# Patient Record
Sex: FEMALE | Race: WHITE | Employment: STUDENT | URBAN - NONMETROPOLITAN AREA
[De-identification: names, ages, dates, MRNs, and addresses within clinical notes are randomized per-mention and may not be internally consistent; named-entity substitution may affect disease eponyms.]

---
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
## 2019-03-13 ENCOUNTER — ALLIED HEALTH/NURSE VISIT (OUTPATIENT)
Dept: FAMILY MEDICINE | Facility: OTHER | Age: 19
End: 2019-03-13
Attending: PEDIATRICS
Payer: COMMERCIAL

## 2019-03-13 ENCOUNTER — ALLIED HEALTH/NURSE VISIT (OUTPATIENT)
Dept: FAMILY MEDICINE | Facility: OTHER | Age: 19
End: 2019-03-13

## 2019-03-13 DIAGNOSIS — Z23 NEED FOR VACCINATION: Primary | ICD-10-CM

## 2019-03-13 DIAGNOSIS — Z23 NEED FOR PROPHYLACTIC VACCINATION AND INOCULATION AGAINST INFLUENZA: ICD-10-CM

## 2019-03-13 PROCEDURE — 90686 IIV4 VACC NO PRSV 0.5 ML IM: CPT

## 2019-03-13 PROCEDURE — 90472 IMMUNIZATION ADMIN EACH ADD: CPT

## 2019-03-13 PROCEDURE — 90632 HEPA VACCINE ADULT IM: CPT

## 2019-03-13 PROCEDURE — 90471 IMMUNIZATION ADMIN: CPT

## 2019-03-13 PROCEDURE — 90691 TYPHOID VACCINE IM: CPT

## 2019-03-13 NOTE — PROGRESS NOTES
Please sign order for Typhoid injection today. She is in schedule for the Hep a Flu and Typhoid. Presumed traveling out of country. Order is T up for you if ok . Thank You. Ruby Ramey RN on 3/13/2019 at 7:59 AM

## 2019-03-13 NOTE — PROGRESS NOTES
Verified patient's first and last name, and . Patient stated reason for visit today is to receive Hep A, Flu, and Typhoid shots. Patient stated she will be traveling to [place] on college trip this upcoming May. Patient stated allergy to naproxen; denied having any other allergies. Patient denied allergies to yeast gelatin neosporin eggs thimerasol or latex or past reactions to vaccinations.Hepatitis A, Influenza, and Typhoid injections prepared and administered as ordered. VIS handouts provided for all vaccinations. Updated MIIC provided to patient. Instructed to wait 15 min post injection in the lobby and to report any symptoms of a reaction to  staff who will notify nurse to check in on patient.       Injectable Influenza Immunization Documentation    1.  Is the person to be vaccinated sick today?  No    2. Does the person to be vaccinated have an allergy to a component   of the vaccine?   No  Egg Allergy Algorithm Link    3. Has the person to be vaccinated ever had a serious reaction   to influenza vaccine in the past?   No    4. Has the person to be vaccinated ever had Guillain-Barré syndrome?   No    Form completed by Aisha Sevilla RN on 3/13/2019 at 10:57 AM        Aisha Sevilla RN on 3/13/2019 at 11:14 AM

## 2019-04-24 ENCOUNTER — TRANSFERRED RECORDS (OUTPATIENT)
Dept: HEALTH INFORMATION MANAGEMENT | Facility: OTHER | Age: 19
End: 2019-04-24

## 2019-05-21 ENCOUNTER — TELEPHONE (OUTPATIENT)
Dept: FAMILY MEDICINE | Facility: OTHER | Age: 19
End: 2019-05-21

## 2019-05-21 NOTE — TELEPHONE ENCOUNTER
Left message for patients mother to call back. We need to get more information on what she is needing. Is she looking for a follow up appointment after surgery? If she has questions on what to do after surgery, she should speak to one of the nurses where the patient is having surgery.    Kelsey Hernandez LPN on 5/21/2019 at 9:50 AM

## 2019-05-24 ENCOUNTER — TRANSFERRED RECORDS (OUTPATIENT)
Dept: HEALTH INFORMATION MANAGEMENT | Facility: OTHER | Age: 19
End: 2019-05-24

## 2019-05-28 NOTE — TELEPHONE ENCOUNTER
Patients mother was contacted. Patient had surgery today and is to follow up in clinic and have AP pelvis xray in 3 weeks.   I called facility where patient had surgery and they will fax over notes on patient. Dr. Tucker Corrigan was the surgeon.    Kelsey Hernandez LPN on 5/28/2019 at 3:48 PM

## 2019-06-05 ENCOUNTER — APPOINTMENT (OUTPATIENT)
Dept: ULTRASOUND IMAGING | Facility: OTHER | Age: 19
End: 2019-06-05
Attending: FAMILY MEDICINE
Payer: COMMERCIAL

## 2019-06-05 ENCOUNTER — NURSE TRIAGE (OUTPATIENT)
Dept: PEDIATRICS | Facility: OTHER | Age: 19
End: 2019-06-05

## 2019-06-05 ENCOUNTER — HOSPITAL ENCOUNTER (EMERGENCY)
Facility: OTHER | Age: 19
Discharge: HOME | End: 2019-06-05
Attending: FAMILY MEDICINE | Admitting: FAMILY MEDICINE
Payer: COMMERCIAL

## 2019-06-05 VITALS
TEMPERATURE: 97.7 F | HEIGHT: 70 IN | OXYGEN SATURATION: 98 % | RESPIRATION RATE: 16 BRPM | BODY MASS INDEX: 23.62 KG/M2 | DIASTOLIC BLOOD PRESSURE: 72 MMHG | HEART RATE: 78 BPM | SYSTOLIC BLOOD PRESSURE: 116 MMHG | WEIGHT: 165 LBS

## 2019-06-05 DIAGNOSIS — M79.604 PAIN OF RIGHT LOWER EXTREMITY: ICD-10-CM

## 2019-06-05 PROCEDURE — 93971 EXTREMITY STUDY: CPT | Mod: RT

## 2019-06-05 PROCEDURE — 99283 EMERGENCY DEPT VISIT LOW MDM: CPT | Mod: Z6 | Performed by: FAMILY MEDICINE

## 2019-06-05 PROCEDURE — 99284 EMERGENCY DEPT VISIT MOD MDM: CPT | Mod: 25 | Performed by: FAMILY MEDICINE

## 2019-06-05 RX ORDER — FERROUS SULFATE 325(65) MG
325 TABLET ORAL
COMMUNITY
Start: 2019-05-26 | End: 2019-06-27

## 2019-06-05 RX ORDER — CELECOXIB 200 MG/1
200 CAPSULE ORAL 2 TIMES DAILY
COMMUNITY
Start: 2019-05-26 | End: 2019-06-14

## 2019-06-05 RX ORDER — OXYCODONE HYDROCHLORIDE 5 MG/1
TABLET ORAL
COMMUNITY
Start: 2019-05-27 | End: 2019-06-14

## 2019-06-05 RX ORDER — HYDROXYZINE HYDROCHLORIDE 25 MG/1
25-50 TABLET, FILM COATED ORAL 4 TIMES DAILY PRN
COMMUNITY
Start: 2019-05-24 | End: 2019-06-14

## 2019-06-05 RX ORDER — DOCUSATE SODIUM 100 MG/1
100 CAPSULE, LIQUID FILLED ORAL 2 TIMES DAILY
COMMUNITY
Start: 2019-05-24 | End: 2019-06-27

## 2019-06-05 RX ORDER — ACETAMINOPHEN 325 MG/1
650 TABLET ORAL
COMMUNITY
Start: 2019-05-26

## 2019-06-05 ASSESSMENT — ENCOUNTER SYMPTOMS
HEMATOLOGIC/LYMPHATIC NEGATIVE: 1
SHORTNESS OF BREATH: 0
APPETITE CHANGE: 0
EYES NEGATIVE: 1
CHILLS: 0
ACTIVITY CHANGE: 1
CARDIOVASCULAR NEGATIVE: 1
GASTROINTESTINAL NEGATIVE: 1
PSYCHIATRIC NEGATIVE: 1
FEVER: 0

## 2019-06-05 ASSESSMENT — MIFFLIN-ST. JEOR: SCORE: 1598.69

## 2019-06-05 NOTE — TELEPHONE ENCOUNTER
"Mom calling and states Vicki just had right hip reconstruction surgery on May 24th.  Mom states that on Monday Parveens foot is swollen and painful and having calf pain.  Mom states Vicki also has Factor V and is on blood thinners.  States Vicki has been weaker and instead of using walker has been using wheelchair.  Mom advised to bring Vicki into ER now, mom verbalized understanding and will be on there way.    Viviane Davies RN on 6/5/2019 at 8:59 AM      Additional Information    Negative: Looks like a broken bone or dislocated joint (e.g., crooked or deformed)    Negative: Sounds like a life-threatening emergency to the triager    Negative: Followed a hip injury    Negative: Followed a knee injury    Negative: Followed an ankle or foot injury    Negative: Back pain radiating (shooting) into leg(s)    Negative: Foot pain is the main symptom    Negative: Ankle pain is the main symptom    Negative: Knee pain is the main symptom    Negative: Leg swelling is the main symptom    Negative: Chest pain    Negative: Difficulty breathing    Negative: Entire foot is cool or blue in comparison to other side    Negative: Unable to walk    History of inherited increased risk of blood clots (e.g., factor 5 Leiden, antithrombin 3, protein C or protein S deficiency, prothrombin mutation)    Answer Assessment - Initial Assessment Questions  1. ONSET: \"When did the pain start?\"       Monday   2. LOCATION: \"Where is the pain located?\"       Right calf area  3. PAIN: \"How bad is the pain?\"    (Scale 1-10; or mild, moderate, severe)    -  MILD (1-3): doesn't interfere with normal activities     -  MODERATE (4-7): interferes with normal activities (e.g., work or school) or awakens from sleep, limping     -  SEVERE (8-10): excruciating pain, unable to do any normal activities, unable to walk      Hard to tell between everything s  4. WORK OR EXERCISE: \"Has there been any recent work or exercise that involved this part of the " "body?\"       Yes just had surgery bed ridden  5. CAUSE: \"What do you think is causing the leg pain?\"      Just recent surgery  6. OTHER SYMPTOMS: \"Do you have any other symptoms?\" (e.g., chest pain, back pain, breathing difficulty, swelling, rash, fever, numbness, weakness)      Swelling, weakness,  7. PREGNANCY: \"Is there any chance you are pregnant?\" \"When was your last menstrual period?\"      n/a    Protocols used: LEG PAIN-A-OH      "

## 2019-06-05 NOTE — DISCHARGE INSTRUCTIONS
Use tylenol regularly for the next two days . Use even if you are not using the muscle relaxants.Followup with Ortho as needed.

## 2019-06-05 NOTE — ED PROVIDER NOTES
"  History     Chief Complaint   Patient presents with     Leg Pain     DVT r/o, hip sx 5/24, factor V lieden      HPI  Vicki Chavez is a 19 year old female with a history of hip dysplasia and factor V Leiden mutation who into the emergency room with right leg pain.  She underwent hip arthroscopy with repair osteotomy with a \"Lyric\" procedure on 5/24/2019 and was discharged on 5/29/2019 she did have some issues with acute blood loss anemia with a hemoglobin down to 7.8 with recovery back to 8.3.  She was sent home on Eliquis secondary to factor V Leiden mutation for prevention of VTE.  She presented to the emergency room with increasing right leg pain.  She had spoken with her surgeon who wanted her to be evaluated for a venous clot.  She has no other symptoms or complaints no shortness of breath or chest pain.  Has some burning in her foot but by review of her chart that would be considered normal in the postop area per the surgeon.    Allergies:  Allergies   Allergen Reactions     Naproxen Hives     Urticaria,joint and lip angioedema, no respiratory compromise.        Problem List:    Patient Active Problem List    Diagnosis Date Noted     Headache 02/14/2018     Priority: Medium     Overview:   Vicki gets both migraine and daily tension headaches.  Tends to lose vision with migraines.  Seeing chiropractor and feels that is helpful.  Signed by Corina Palacio MD .....7/18/2014 2:42 PM       Family history of factor V Leiden mutation 07/15/2016     Priority: Medium     Compound nevus 07/20/2015     Priority: Medium        Past Medical History:    Past Medical History:   Diagnosis Date     Constipation      Headache      Other allergy status, other than to drugs and biological substances      Other fracture of right lower leg, initial encounter for closed fracture      Personal history of diseases of skin or subcutaneous tissue        Past Surgical History:    Past Surgical History:   Procedure Laterality " "Date     OTHER SURGICAL HISTORY      7-2016,[id number],OTHER,Right,Lucretia Melvin       Family History:    Family History   Problem Relation Age of Onset     Other - See Comments Mother         Psychiatric illness,describes herself as an anxious person     Other - See Comments Father         Factor V Leiden     Heart Disease Father         Heart Disease, PEs x 4     Asthma Father         Asthma     Heart Disease Maternal Grandfather         Heart Disease, MI, pacemaker       Social History:  Marital Status:  Single [1]  Social History     Tobacco Use     Smoking status: Never Smoker     Smokeless tobacco: Never Used   Substance Use Topics     Alcohol use: No     Drug use: Unknown     Types: Other     Comment: Drug use: No        Medications:      acetaminophen (TYLENOL) 325 MG tablet   apixaban ANTICOAGULANT (ELIQUIS) 2.5 MG tablet   celecoxib (CELEBREX) 200 MG capsule   docusate sodium (COLACE) 100 MG capsule   ferrous sulfate (FEROSUL) 325 (65 Fe) MG tablet   hydrOXYzine (ATARAX) 25 MG tablet   oxyCODONE (ROXICODONE) 5 MG tablet         Review of Systems   Constitutional: Positive for activity change. Negative for appetite change, chills and fever.   HENT: Negative.    Eyes: Negative.    Respiratory: Negative for shortness of breath.    Cardiovascular: Negative.  Negative for chest pain.   Gastrointestinal: Negative.    Genitourinary: Negative.    Musculoskeletal: Positive for gait problem.        Right leg pain.   Skin: Negative.    Hematological: Negative.    Psychiatric/Behavioral: Negative.        Physical Exam   BP: 117/63  Pulse: 77  Heart Rate: 90  Temp: 97.7  F (36.5  C)  Resp: 16  Height: 177.8 cm (5' 10\")  Weight: 74.8 kg (165 lb)  SpO2: 99 %      Physical Exam   Constitutional: She is oriented to person, place, and time. She appears well-developed and well-nourished. No distress.   HENT:   Head: Normocephalic and atraumatic.   Right Ear: External ear normal.   Left Ear: External ear normal.   Nose: Nose " normal.   Mouth/Throat: Oropharynx is clear and moist.   Eyes: Pupils are equal, round, and reactive to light. Conjunctivae and EOM are normal.   Neck: Normal range of motion. Neck supple. No thyromegaly present.   Cardiovascular: Normal rate, regular rhythm and normal heart sounds.   Pulmonary/Chest: Effort normal and breath sounds normal. No stridor. No respiratory distress. She has no wheezes.   Musculoskeletal: Normal range of motion. She exhibits tenderness. She exhibits no edema or deformity.   Slight tenderness with palpation of the calf.  Calf does not appear swollen.   Neurological: She is alert and oriented to person, place, and time.   Skin: Skin is warm and dry. Capillary refill takes less than 2 seconds. She is not diaphoretic.   Nursing note and vitals reviewed.      ED Course   Patient seen and examined.  Sent for venous Doppler ultrasound of the right lower extremity.  Ultrasound was read as negative.  Informed patient and her mother and they are comfortable with the plan to just follow-up with her orthopedic surgeon.  No additional blood work or work-up is necessary at this time.  If they feel there is an increase of pain they should let to their surgeon know and/or return to the emergency room.     Procedures          Results for orders placed or performed during the hospital encounter of 06/05/19 (from the past 24 hour(s))   US Lower Extremity Venous Duplex Right    Narrative    EXAM:US LOWER EXTREMITY VENOUS DUPLEX RIGHT    HISTORY: right calf pain s/p hip surgery- has Factor V Leiden  mutation.       COMPARISONS: None    TECHNIQUE: Venous duplex ultrasonography of the right lower extremity  was performed.     FINDINGS: The common femoral vein, femoral vein and popliteal vein are  fully compressible with spontaneous and augmentable venous flow.  Visualized calf veins are also patent.         Impression    IMPRESSION: Negative for DVT.    SHAN MCKEON MD       Medications - No data to  display    Assessments & Plan (with Medical Decision Making)     I have reviewed the nursing notes.    I have reviewed the findings, diagnosis, plan and need for follow up with the patient.  See note above.     Medication List      There are no discharge medications for this visit.         Final diagnoses:   Pain of right lower extremity       6/5/2019   [facility]  06/05/19 [id number]

## 2019-06-05 NOTE — ED TRIAGE NOTES
Patient to ER reporting R calf pain x 2 days, she also admits to R hip and foot pain since her hip dysplasia sx on 5/24. Patient reports hx of factor V Leiden.

## 2019-06-05 NOTE — ED AVS SNAPSHOT
[facility]  [address]  [place] [address]  Phone:  [phone]  Fax:  [phone]                                    Vicki Chavez   MRN: [id number]    Department:  [facility]   Date of Visit:  6/5/2019           After Visit Summary Signature Page    I have received my discharge instructions, and my questions have been answered. I have discussed any challenges I see with this plan with the nurse or doctor.    ..........................................................................................................................................  Patient/Patient Representative Signature      ..........................................................................................................................................  Patient Representative Print Name and Relationship to Patient    ..................................................               ................................................  Date                                   Time    ..........................................................................................................................................  Reviewed by Signature/Title    ...................................................              ..............................................  Date                                               Time          22EPIC Rev 08/18

## 2019-06-14 ENCOUNTER — OFFICE VISIT (OUTPATIENT)
Dept: FAMILY MEDICINE | Facility: OTHER | Age: 19
End: 2019-06-14
Attending: FAMILY MEDICINE
Payer: COMMERCIAL

## 2019-06-14 ENCOUNTER — HOSPITAL ENCOUNTER (OUTPATIENT)
Dept: GENERAL RADIOLOGY | Facility: OTHER | Age: 19
Discharge: HOME | End: 2019-06-14
Attending: FAMILY MEDICINE | Admitting: FAMILY MEDICINE
Payer: COMMERCIAL

## 2019-06-14 VITALS
HEART RATE: 92 BPM | DIASTOLIC BLOOD PRESSURE: 78 MMHG | SYSTOLIC BLOOD PRESSURE: 118 MMHG | BODY MASS INDEX: 23.65 KG/M2 | RESPIRATION RATE: 16 BRPM | TEMPERATURE: 97.5 F | HEIGHT: 70 IN | WEIGHT: 165.2 LBS

## 2019-06-14 DIAGNOSIS — Z98.890 STATUS POST HIP SURGERY: Primary | ICD-10-CM

## 2019-06-14 DIAGNOSIS — Z98.890 STATUS POST HIP SURGERY: ICD-10-CM

## 2019-06-14 DIAGNOSIS — D62 ANEMIA DUE TO BLOOD LOSS, ACUTE: ICD-10-CM

## 2019-06-14 LAB — HGB BLD-MCNC: 12 G/DL (ref 11.7–15.7)

## 2019-06-14 PROCEDURE — 99213 OFFICE O/P EST LOW 20 MIN: CPT | Performed by: FAMILY MEDICINE

## 2019-06-14 PROCEDURE — 72170 X-RAY EXAM OF PELVIS: CPT

## 2019-06-14 PROCEDURE — 36415 COLL VENOUS BLD VENIPUNCTURE: CPT | Mod: ZL | Performed by: FAMILY MEDICINE

## 2019-06-14 PROCEDURE — 85018 HEMOGLOBIN: CPT | Mod: ZL | Performed by: FAMILY MEDICINE

## 2019-06-14 ASSESSMENT — MIFFLIN-ST. JEOR: SCORE: 1599.59

## 2019-06-14 ASSESSMENT — PAIN SCALES - GENERAL: PAINLEVEL: EXTREME PAIN (8)

## 2019-06-14 NOTE — NURSING NOTE
Patient presents today for follow up on right hip surgery.  Medication Reconciliation Complete    Kelsey Hernandez LPN  6/14/2019 12:41 PM

## 2019-06-14 NOTE — PROGRESS NOTES
"SUBJECTIVE:  Vicki Chavez is a 19 year old female here for follow-up.  She had right hip arthroscopy for labral tear and impingement on May 24 in [place].  She comes in today for suture removal.  She is having increasing pain going on her right leg is consistent with neuropathy.  She contacted her surgeon who states that this is expected after her procedure.  She is also seen in emergency room where an ultrasound performed to rule out DVT and came back negative.  She reports that her pain has been controlled with Tylenol.    Procedure was comp located by acute blood loss anemia.  She is currently using iron is tolerating this well.    ROS:    As above otherwise ROS is unremarkable.    OBJECTIVE:  /78   Pulse 92   Temp 97.5  F (36.4  C) (Tympanic)   Resp 16   Ht 1.778 m (5' 10\")   Wt 74.9 kg (165 lb 3.2 oz)   LMP 05/15/2019   BMI 23.70 kg/m      EXAM:  General Appearance: Pleasant, alert, appropriate appearance for age. No acute distress  Skin: Large incision is healing well.  She has 2 smaller arthroscopic incisions with sutures that were removed without any difficulty.  Incisions are clean, dry and intact.    Results for orders placed or performed in visit on 06/14/19   Hemoglobin   Result Value Ref Range    Hemoglobin 12.0 11.7 - 15.7 g/dL         ASSESSEMENT AND PLAN:    1. Status post hip surgery    2. Anemia due to blood loss, acute      Sutures removed without difficulty.  She will follow-up with orthopedics as scheduled.  X-ray was repeated as requested by her surgeon.  I will attempt to send his pictures through PACS.    Her hemoglobin is seen above.  Given that she is essentially back to her preoperative level she can stop her iron.  Would recommend continuing stool softeners given her amount of constipation on her x-ray.    Erickson Magana MD    This document was prepared using voice generated software.  While every attempt was made for accuracy, grammatical errors may exist.  "

## 2019-06-14 NOTE — Clinical Note
Send copy of today's progress note to Dr. Christopher Corrigan, [facility] Ortho and sports medicine department.

## 2019-06-20 ENCOUNTER — HOSPITAL ENCOUNTER (OUTPATIENT)
Dept: PHYSICAL THERAPY | Facility: OTHER | Age: 19
Setting detail: THERAPIES SERIES
End: 2019-06-20
Payer: COMMERCIAL

## 2019-06-20 DIAGNOSIS — Q65.89 HIP DYSPLASIA: ICD-10-CM

## 2019-06-20 PROCEDURE — 97110 THERAPEUTIC EXERCISES: CPT | Mod: GP

## 2019-06-20 PROCEDURE — 97161 PT EVAL LOW COMPLEX 20 MIN: CPT | Mod: GP

## 2019-06-20 NOTE — PROGRESS NOTES
06/20/19 0700   General Information   Type of Visit Initial OP Ortho PT Evaluation   Start of Care Date 06/20/19   Referring Physician Viktor Petersen MD   Patient/Family Goals Statement return to normal activities   Orders Evaluate and Treat   Orders Comment see rehab protocol   Date of Order 05/28/19   Certification Required? No   Medical Diagnosis s/p right hip arthroscopy and right periacetabular osteotomy   Surgical/Medical history reviewed Yes   General Information Comments flat foot touch weight bearing 6 weeks, no more than 90 degrees hip flexion, no resisted hip flexion   Body Part(s)   Body Part(s) Hip   Presentation and Etiology   Pertinent history of current problem (include personal factors and/or comorbidities that impact the POC) History of bilateral hip displasia, prior scope of right hip by Dr. Carpenter. Recent underwent a right hip scope and right periacetabular osteotomy on 5/24/19 byt Dr. Corrigan and Dr. Petersen at [facility]. TTWB for 6 weeks with crutches or walker. 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 Suture removal 6/14 at [facility]. Currently needs assist with most actvities during the day. No longer using ice. Taking tylenol prn. Working on quad sets, glute sets, ankle pumps. Follwing restrictions. Mother present during session. Having nerve pain in right lower leg and foot. Constipation has improved now that she is off pain meds. Sleeping in [place]. STarting to use bed more but can't last all night. Follow up in [place] on 7/2/19. Patient will return to [place] in august for school and will continue PT there.    Impairments A. Pain;B. Decreased WB tolerance;C. Swelling;D. Decreased ROM;E. Decreased flexibility;F. Decreased strength and endurance;G. Impaired balance;H. Impaired gait;J. Burning;K. Numbness;L. Tingling   Functional Limitations perform activities of daily living;perform desired leisure / sports activities   Onset date of current  episode/exacerbation 05/24/19   Chronicity New   Pain rating (0-10 point scale) Worst (/10)   Worst (/10) 5   Pain quality A. Sharp;B. Dull;C. Aching;E. Shooting   Frequency of pain/symptoms A. Constant   Pain/symptoms exacerbated by A. Sitting;B. Walking;C. Lifting;D. Carrying;G. Certain positions   Pain/symptoms eased by E. Changing positions;I. OTC medication(s)   Progression of symptoms since onset: Improved   Prior Level of Function   Prior Level of Function-Mobility indepedent   Prior Level of Function-ADLs indepedent   Current Level of Function   Patient role/employment history B. Student   Hip Objective Findings   Side (if bilateral, select both right and left) Right   Observation closed incision, dry skin, needing lotion   Integumentary  limited scar mobility right anterior pelvis   Gait/Locomotion requires axillary crutches, TTWB   Hip ROM Comments neutral hip extension, full right knee motion   Hip/Knee Strength Comments deferred due to recent surgery and precautions   Hip Flexibility Comments hamstring tightness bilateral   Palpation noted inferior right pubic shear, right leg length long in supine, right ASIS superior in supine.    Right Hip Abduction PROM to 90, pain at end   Planned Therapy Interventions   Planned Therapy Interventions balance training;gait training;manual therapy;neuromuscular re-education;strengthening;stretching;transfer training   Planned Modality Interventions   Planned Modality Interventions Cryotherapy;Electrical stimulation   Clinical Impression   Criteria for Skilled Therapeutic Interventions Met yes, treatment indicated   PT Diagnosis right  hip pain, muscle weakness, limited ambuation and balance   Influenced by the following impairments pain, weakness, loss of motion, numbness, burning   Functional limitations due to impairments sit to stand, supine to sit, sit to supine, gait, balance, lower body dressing, stair use, showers, driving   Clinical Presentation  Stable/Uncomplicated   Clinical Decision Making (Complexity) Low complexity   Therapy Frequency 2 times/Week   Predicted Duration of Therapy Intervention (days/wks) 6 months  (as needed)   Risk & Benefits of therapy have been explained Yes   Patient, Family & other staff in agreement with plan of care Yes   Clinical Impression Comments Patient s/p right hip scope and MISAEL.    ORTHO GOALS   PT Ortho Eval Goals 1;2;3;4;5;6   Ortho Goal 1   Goal Identifier ADL's   Goal Description Patient to return to lower body dressing without assistance.    Target Date 12/20/19   Ortho Goal 2   Goal Identifier gait   Goal Description Patient to return to full weight bearing on right leg and ambulation without AD on level surfaces.   Target Date 12/20/19   Ortho Goal 3   Goal Identifier stair use   Goal Description Patient to negotiate a full fight of steps without hand rail, returnign to prior functional status.   Target Date 12/20/19   Ortho Goal 4   Goal Identifier driving   Goal Description Patient to return to driving.   Target Date 12/20/19   Ortho Goal 5   Goal Identifier MMT   Goal Description Patient to progress to 5/5 MMT of right and left hip muscles for stabilization during daily tasks and mobility.   Target Date 12/20/19   Ortho Goal 6   Goal Identifier HEP   Goal Description Patient to be complaint with HEP to maximize recovery and functional strength.    Target Date 12/20/19   Total Evaluation Time   PT Eval, Low Complexity Minutes (86250) 15

## 2019-06-27 ENCOUNTER — OFFICE VISIT (OUTPATIENT)
Dept: FAMILY MEDICINE | Facility: OTHER | Age: 19
End: 2019-06-27
Attending: PHYSICIAN ASSISTANT
Payer: COMMERCIAL

## 2019-06-27 VITALS
OXYGEN SATURATION: 98 % | DIASTOLIC BLOOD PRESSURE: 70 MMHG | HEART RATE: 105 BPM | HEIGHT: 70 IN | TEMPERATURE: 97.2 F | RESPIRATION RATE: 16 BRPM | BODY MASS INDEX: 23.58 KG/M2 | SYSTOLIC BLOOD PRESSURE: 120 MMHG | WEIGHT: 164.7 LBS

## 2019-06-27 DIAGNOSIS — M79.2 NERVE PAIN: Primary | ICD-10-CM

## 2019-06-27 PROCEDURE — 99214 OFFICE O/P EST MOD 30 MIN: CPT | Performed by: PHYSICIAN ASSISTANT

## 2019-06-27 RX ORDER — GABAPENTIN 300 MG/1
CAPSULE ORAL
Qty: 18 CAPSULE | Refills: 0 | Status: SHIPPED | OUTPATIENT
Start: 2019-06-27 | End: 2020-03-18

## 2019-06-27 ASSESSMENT — PAIN SCALES - GENERAL: PAINLEVEL: EXTREME PAIN (8)

## 2019-06-27 ASSESSMENT — MIFFLIN-ST. JEOR: SCORE: 1597.32

## 2019-06-27 NOTE — PROGRESS NOTES
"SUBJECTIVE:  Vicki Chavez is a 19 year old female presents to [facility] for help with nerve pain  Patient had hip surgery 5/24/19 for Hip displasia. She has been in contact with her surgeon regarding the pain and has a follow up next week in [place]  Associated symptoms - pain in toes and medial arch of right foot  Pain was very severe last night, she couldn't sleep    Location - dorsal and plantar forefoot and toes  Quality - intermittent sharp shooting pain duration moments to seconds 10/10 pain  Constant dull ache - 8/10  Aggravated by - bending knee and ankle, brushing toes and medial foot on bed sheet  Alleviated by - nothing  Associated symptoms - mild swelling, dull ache and sharp pains   Treatments - tylenol, ice, essential oils, heating pad, stretching   Prior fracture or injury - Hip surgery 5/24/19      Past Medical History:   Diagnosis Date     Constipation     04/20/07     Headache     04/10/09     Other allergy status, other than to drugs and biological substances     4/14/06     Other fracture of right lower leg, initial encounter for closed fracture     4/2014     Personal history of diseases of skin or subcutaneous tissue     4/14/06     Current Outpatient Medications   Medication     acetaminophen (TYLENOL) 325 MG tablet     apixaban ANTICOAGULANT (ELIQUIS) 2.5 MG tablet     No current facility-administered medications for this visit.         Allergies   Allergen Reactions     Naproxen Hives     Urticaria,joint and lip angioedema, no respiratory compromise.          ROS  General: feels well, no fever  Musculoskeletal: injury per HPI      OBJECTIVE:  Vitals:    06/27/19 1051   BP: 120/70   BP Location: Right arm   Patient Position: Sitting   Cuff Size: Adult Regular   Pulse: 105   Resp: 16   Temp: 97.2  F (36.2  C)   TempSrc: Tympanic   SpO2: 98%   Weight: 74.7 kg (164 lb 11.2 oz)   Height: 1.778 m (5' 10\")     Vital signs as noted above.  Appearance: in no apparent " distress.  Musculoskeletal: hip surgery incision - 10 cm vertical incision and 3 cm branch. No swelling, erythema, warmth  Inspection: no infection to incision. Toes and foot no abnormality noted  Palpation: Toes forefoot and medial arch are very painful to touch  Neurovascular: normal pulses, cap refill,  temperature  ROM: normal      ASSESSMENT:  (M79.2) Nerve pain  (primary encounter diagnosis)    Plan: gabapentin (NEURONTIN) 300 MG capsule    Will try gabapentin taper  Discussed dosing  Patient will have surgeon continue or provide a taper to stop medication. Discussed potential side effects  Follow up with Surgeon next week  Return to clinic if symptoms persist or worsen  Patient received verbal and written instruction including review of warning signs    Toshia Urbina PA-C on 6/27/2019 at 7:56 PM

## 2019-06-27 NOTE — PATIENT INSTRUCTIONS
Start gabapentin 300 mg at bedtime day 1, day 2 - 300 mg bid, day 3 - 7 300 mg tid  Follow up with surgeon next week  Continue with tylenol 1000 mg every 4-6 hours as needed

## 2019-06-27 NOTE — NURSING NOTE
Chief Complaint   Patient presents with     Leg Pain       Medication Reconciliation: complete   Patient presents with nerve pain. Patient had reconstructive hip surgery 5 weeks ago. Patient was told that it's normal but pain is worsened. Patient has no pain meds currently. Patient is on blood thinners.  Patient has a f/u next week with surgeon in [place].   Amy Villela LPN  ..................6/27/2019   10:50 AM

## 2019-07-01 ENCOUNTER — HOSPITAL ENCOUNTER (OUTPATIENT)
Dept: PHYSICAL THERAPY | Facility: OTHER | Age: 19
Setting detail: THERAPIES SERIES
End: 2019-07-01
Payer: COMMERCIAL

## 2019-07-01 PROCEDURE — 97110 THERAPEUTIC EXERCISES: CPT | Mod: GP

## 2019-07-08 ENCOUNTER — HOSPITAL ENCOUNTER (OUTPATIENT)
Dept: PHYSICAL THERAPY | Facility: OTHER | Age: 19
Setting detail: THERAPIES SERIES
End: 2019-07-08
Payer: COMMERCIAL

## 2019-07-08 PROCEDURE — 97112 NEUROMUSCULAR REEDUCATION: CPT | Mod: GP

## 2019-07-08 PROCEDURE — 97110 THERAPEUTIC EXERCISES: CPT | Mod: GP

## 2019-07-10 ENCOUNTER — HOSPITAL ENCOUNTER (OUTPATIENT)
Dept: PHYSICAL THERAPY | Facility: OTHER | Age: 19
Setting detail: THERAPIES SERIES
End: 2019-07-10
Payer: COMMERCIAL

## 2019-07-10 PROCEDURE — 97110 THERAPEUTIC EXERCISES: CPT | Mod: GP

## 2019-07-17 ENCOUNTER — HOSPITAL ENCOUNTER (OUTPATIENT)
Dept: PHYSICAL THERAPY | Facility: OTHER | Age: 19
Setting detail: THERAPIES SERIES
End: 2019-07-17
Payer: COMMERCIAL

## 2019-07-17 PROCEDURE — 97112 NEUROMUSCULAR REEDUCATION: CPT | Mod: GP

## 2019-07-17 PROCEDURE — 97110 THERAPEUTIC EXERCISES: CPT | Mod: GP

## 2019-07-24 ENCOUNTER — HOSPITAL ENCOUNTER (OUTPATIENT)
Dept: PHYSICAL THERAPY | Facility: OTHER | Age: 19
Setting detail: THERAPIES SERIES
End: 2019-07-24
Payer: COMMERCIAL

## 2019-07-24 PROCEDURE — 97110 THERAPEUTIC EXERCISES: CPT | Mod: GP

## 2019-08-02 ENCOUNTER — HOSPITAL ENCOUNTER (OUTPATIENT)
Dept: PHYSICAL THERAPY | Facility: OTHER | Age: 19
Setting detail: THERAPIES SERIES
End: 2019-08-02
Payer: COMMERCIAL

## 2019-08-02 PROCEDURE — 97110 THERAPEUTIC EXERCISES: CPT | Mod: GP

## 2019-08-09 ENCOUNTER — HOSPITAL ENCOUNTER (OUTPATIENT)
Dept: PHYSICAL THERAPY | Facility: OTHER | Age: 19
Setting detail: THERAPIES SERIES
End: 2019-08-09
Payer: COMMERCIAL

## 2019-08-09 PROCEDURE — 97112 NEUROMUSCULAR REEDUCATION: CPT | Mod: GP

## 2019-08-09 PROCEDURE — 97110 THERAPEUTIC EXERCISES: CPT | Mod: GP

## 2019-08-16 ENCOUNTER — HOSPITAL ENCOUNTER (OUTPATIENT)
Dept: PHYSICAL THERAPY | Facility: OTHER | Age: 19
Setting detail: THERAPIES SERIES
End: 2019-08-16
Payer: COMMERCIAL

## 2019-08-16 PROCEDURE — 97112 NEUROMUSCULAR REEDUCATION: CPT | Mod: GP

## 2019-08-16 PROCEDURE — 97110 THERAPEUTIC EXERCISES: CPT | Mod: GP

## 2019-08-16 NOTE — PROGRESS NOTES
Outpatient Physical Therapy Discharge Note     Patient: Vicki Chavez  : 2000    Beginning/End Dates of Reporting Period:  19 to 2019    Referring Provider: Viktor Petersen MD    Therapy Diagnosis: right  hip pain, muscle weakness, limited ambuation and balance     Client Self Report: Less pain over the last week. Has contact for PT in [place] to fax notes, etc. No pool this week, [facility] [place] [facility] Working on walking with at least 50% weight bearing through right leg. Can complete floor transfers but it is not easy. Will be practicing driving over the next week. Has a handicap accessible apartment on campus. Follow up with surgical team 19.     Objective Measurements:  Objective Measure: joint mobility  Details: ERS right L5     Hip ROM 0-110, limited by pain at end range flexion    Goals:  Goal Identifier ADL's   Goal Description Patient to return to lower body dressing without assistance.    Target Date 19   Date Met   19   Progress:     Goal Identifier gait   Goal Description Patient to return to full weight bearing on right leg and ambulation without AD on level surfaces.   Target Date 19   Date Met      Progress: still working towards this goal, at 50 %      Goal Identifier stair use   Goal Description Patient to negotiate a full fight of steps without hand rail, returnign to prior functional status.   Target Date 19   Date Met      Progress: still working towards this goal     Goal Identifier driving   Goal Description Patient to return to driving.   Target Date 19   Date Met      Progress: still working towards this gaol     Goal Identifier MMT   Goal Description Patient to progress to 5/5 MMT of right and left hip muscles for stabilization during daily tasks and mobility.   Target Date 19   Date Met      Progress: will continue to progress with this goal during course of rehab     Goal Identifier HEP   Goal Description Patient to be  "complaint with HEP to maximize recovery and functional strength.    Target Date 12/20/19   Date Met   8/16/19   Progress:       Progress Toward Goals:   Progress this reporting period: Patient is now 12 weeks post op MISAEL surgery of the right hip. Date of surgery was 5/24/19. She is still working on increasing her weight bearing tolerance; pain and weakness are limiting factors. Commonly, patient presents with dysfunctions at L5, pubic symphysis and sacrum that add to her pain, limited gait and \"popping\" of the right hip.          Plan:  Discharge from therapy.    Discharge:    Reason for Discharge: Patient returning to college in [place] and will be continuing physical therapy there. [facility], [place], [place]    Equipment Issued: NA    Discharge Plan: Patient to continue home program.  "

## 2020-03-18 ENCOUNTER — OFFICE VISIT (OUTPATIENT)
Dept: FAMILY MEDICINE | Facility: OTHER | Age: 20
End: 2020-03-18
Attending: FAMILY MEDICINE
Payer: COMMERCIAL

## 2020-03-18 VITALS
DIASTOLIC BLOOD PRESSURE: 70 MMHG | WEIGHT: 175.13 LBS | SYSTOLIC BLOOD PRESSURE: 108 MMHG | HEART RATE: 72 BPM | BODY MASS INDEX: 25.13 KG/M2 | TEMPERATURE: 96.4 F | RESPIRATION RATE: 16 BRPM

## 2020-03-18 DIAGNOSIS — Z48.89 ENCOUNTER FOR POST SURGICAL WOUND CHECK: Primary | ICD-10-CM

## 2020-03-18 DIAGNOSIS — L03.311 CELLULITIS OF ABDOMINAL WALL: ICD-10-CM

## 2020-03-18 PROCEDURE — 99213 OFFICE O/P EST LOW 20 MIN: CPT | Performed by: FAMILY MEDICINE

## 2020-03-18 RX ORDER — HYDROXYZINE PAMOATE 25 MG/1
25-50 CAPSULE ORAL 4 TIMES DAILY
COMMUNITY
Start: 2020-03-06 | End: 2020-08-04

## 2020-03-18 RX ORDER — CEPHALEXIN 500 MG/1
500 CAPSULE ORAL 4 TIMES DAILY
Qty: 28 CAPSULE | Refills: 0 | Status: SHIPPED | OUTPATIENT
Start: 2020-03-18 | End: 2020-08-04

## 2020-03-18 RX ORDER — OXYCODONE AND ACETAMINOPHEN 5; 325 MG/1; MG/1
1-2 TABLET ORAL EVERY 6 HOURS PRN
COMMUNITY
Start: 2020-03-06 | End: 2020-08-04

## 2020-03-18 ASSESSMENT — ENCOUNTER SYMPTOMS
SORE THROAT: 0
FATIGUE: 0
CHILLS: 0
SHORTNESS OF BREATH: 0
PALPITATIONS: 0
COUGH: 0
SINUS PAIN: 0
APPETITE CHANGE: 0

## 2020-03-18 ASSESSMENT — PAIN SCALES - GENERAL: PAINLEVEL: MODERATE PAIN (5)

## 2020-03-18 NOTE — NURSING NOTE
"Chief Complaint   Patient presents with     Wound Check       Initial /70   Pulse 72   Temp 96.4  F (35.8  C) (Tympanic)   Resp 16   Wt 79.4 kg (175 lb 2 oz)   LMP 03/05/2020 (Exact Date)   BMI 25.13 kg/m   Estimated body mass index is 25.13 kg/m  as calculated from the following:    Height as of 6/27/19: 1.778 m (5' 10\").    Weight as of this encounter: 79.4 kg (175 lb 2 oz).  Medication Reconciliation: complete    Corie Miles LPN  "

## 2020-03-18 NOTE — PROGRESS NOTES
"Nursing Notes:   Corie Miles LPN  3/18/2020  8:52 AM  Signed  Chief Complaint   Patient presents with     Wound Check     Initial /70   Pulse 72   Temp 96.4  F (35.8  C) (Tympanic)   Resp 16   Wt 79.4 kg (175 lb 2 oz)   LMP 03/05/2020 (Exact Date)   BMI 25.13 kg/m   Estimated body mass index is 25.13 kg/m  as calculated from the following:    Height as of 6/27/19: 1.778 m (5' 10\").    Weight as of this encounter: 79.4 kg (175 lb 2 oz).  Medication Reconciliation: complete  Corie Miles LPN    SUBJECTIVE:   Vicki Chavez is a 19 year old female who presents to clinic today for the following health issues:    HPI  Vicki is here for an incision check s/p hardware removal of R hip.  She had this completed on 3/6/2020 at [facility].  She initially underwent a R hip MISAEL with arthroscopy and labral repair, femoral head/neck offset correction on 5/24/2019  Since being home, she has healed relatively well.  She is having some sharp pains and some itching at incision site.  Noticed in the last few days some spreading redness.  No drainage from site.  Does have a mesh covering/dressing in place that is mostly intact.  No foul smell.   Also has an area of suture coming out of skin that is somewhat bothersome for her.    Patient Active Problem List    Diagnosis Date Noted     Headache 02/14/2018     Priority: Medium     Overview:   Vicki gets both migraine and daily tension headaches.  Tends to lose vision with migraines.  Seeing chiropractor and feels that is helpful.  Signed by Corina Palaico MD .....7/18/2014 2:42 PM       Family history of factor V Leiden mutation 07/15/2016     Priority: Medium     Compound nevus 07/20/2015     Priority: Medium     Past Medical History:   Diagnosis Date     Constipation     04/20/07     Headache     04/10/09     Other allergy status, other than to drugs and biological substances     4/14/06     Other fracture of right lower leg, initial encounter for closed " fracture     4/2014     Personal history of diseases of skin or subcutaneous tissue     4/14/06      Past Surgical History:   Procedure Laterality Date     ARTHROSCOPY HIP Right 07/2016    Lucretia Melvin     ARTHROSCOPY HIP Right 05/24/2019    MISAEL with arthroscopy and labral repair, femoral head/neck offset correction; [employer] [place]     REMOVE HARDWARE LOWER EXTREMITY Right 2020    hip     Family History   Problem Relation Age of Onset     Other - See Comments Mother         Psychiatric illness,describes herself as an anxious person     Other - See Comments Father         Factor V Leiden     Heart Disease Father         Heart Disease, PEs x 4     Asthma Father         Asthma     Diabetes Father      Hyperlipidemia Father      Heart Disease Maternal Grandfather         Heart Disease, MI, pacemaker     Diabetes Paternal Grandmother      Hyperlipidemia Paternal Grandmother      Social History     Tobacco Use     Smoking status: Never Smoker     Smokeless tobacco: Never Used   Substance Use Topics     Alcohol use: Yes     Comment: socially      Social History     Social History Narrative    Matt Father - works at [employer]      Davida Mother -teaches in [employer], 9/01 - tubes     Current Outpatient Medications   Medication Sig Dispense Refill     cephALEXin (KEFLEX) 500 MG capsule Take 1 capsule (500 mg) by mouth 4 times daily for 7 days 28 capsule 0     hydrOXYzine (VISTARIL) 25 MG capsule Take 25-50 mg by mouth 4 times daily As needed       oxyCODONE-acetaminophen (PERCOCET) 5-325 MG tablet Take 1-2 tablets by mouth every 6 hours as needed       acetaminophen (TYLENOL) 325 MG tablet Take 650 mg by mouth       Allergies   Allergen Reactions     Naproxen Hives     Urticaria,joint and lip angioedema, no respiratory compromise.      Review of Systems   Constitutional: Negative for appetite change, chills and fatigue.   HENT: Negative for congestion, sinus pain and sore throat.     Respiratory: Negative for cough and shortness of breath.    Cardiovascular: Negative for chest pain and palpitations.   Skin: Negative for rash.      OBJECTIVE:     /70   Pulse 72   Temp 96.4  F (35.8  C) (Tympanic)   Resp 16   Wt 79.4 kg (175 lb 2 oz)   LMP 03/05/2020 (Exact Date)   BMI 25.13 kg/m    Body mass index is 25.13 kg/m .  Physical Exam  Vitals signs and nursing note reviewed.   Constitutional:       Appearance: Normal appearance.   HENT:      Head: Normocephalic.   Musculoskeletal:      Right hip: She exhibits normal range of motion and no tenderness.   Skin:            Comments: Extending at most 5cm away from incision in irregular distribution - mostly to R flank and toward suprapubic region.  Minimal erythematous spread inferiorly to groin.  No drainage, fluctuation, induration, odor appreciated on exam. No warmth.  3cm of light colored suture extruded from superior margin of incision.  Mesh covering lower half of incision without other obvious suture/dehiscence/etc.   Neurological:      Mental Status: She is alert.     Diagnostic Test Results:  None    ASSESSMENT/PLAN:     1. Encounter for post surgical wound check  Suture trimmed; and recommended to trim mesh dressing that has peeled away.  Can leave remainder in place for full two weeks as it is well adhered for now.    2. Cellulitis of abdominal wall  Erythematous spread; will start Cephalexin 500mg QID x 7 days.  Do not have concern for deeper infection as she is otherwise moving well; afebrile and no systemic complaints.  Discussed no further follow up required in light of Covid-19 concerns; however she is encouraged to write on MyChart and include a picture of incision if failed improvement.  This can help determine follow up plan.  - cephALEXin (KEFLEX) 500 MG capsule; Take 1 capsule (500 mg) by mouth 4 times daily for 7 days  Dispense: 28 capsule; Refill: 0      DO CLEVE Guan [facility] AND [facility]

## 2020-08-04 ENCOUNTER — HOSPITAL ENCOUNTER (OUTPATIENT)
Dept: GENERAL RADIOLOGY | Facility: OTHER | Age: 20
End: 2020-08-04
Attending: FAMILY MEDICINE
Payer: COMMERCIAL

## 2020-08-04 ENCOUNTER — OFFICE VISIT (OUTPATIENT)
Dept: FAMILY MEDICINE | Facility: OTHER | Age: 20
End: 2020-08-04
Attending: FAMILY MEDICINE
Payer: COMMERCIAL

## 2020-08-04 VITALS
WEIGHT: 172 LBS | HEIGHT: 70 IN | DIASTOLIC BLOOD PRESSURE: 70 MMHG | OXYGEN SATURATION: 98 % | BODY MASS INDEX: 24.62 KG/M2 | RESPIRATION RATE: 16 BRPM | SYSTOLIC BLOOD PRESSURE: 118 MMHG | TEMPERATURE: 97.8 F | HEART RATE: 72 BPM

## 2020-08-04 DIAGNOSIS — M25.571 PAIN IN JOINT INVOLVING ANKLE AND FOOT, RIGHT: ICD-10-CM

## 2020-08-04 DIAGNOSIS — M25.571 PAIN IN JOINT INVOLVING ANKLE AND FOOT, RIGHT: Primary | ICD-10-CM

## 2020-08-04 PROCEDURE — 99213 OFFICE O/P EST LOW 20 MIN: CPT | Performed by: FAMILY MEDICINE

## 2020-08-04 PROCEDURE — 73610 X-RAY EXAM OF ANKLE: CPT | Mod: RT

## 2020-08-04 ASSESSMENT — PAIN SCALES - GENERAL: PAINLEVEL: SEVERE PAIN (6)

## 2020-08-04 ASSESSMENT — MIFFLIN-ST. JEOR: SCORE: 1625.44

## 2020-08-04 NOTE — PROGRESS NOTES
"SUBJECTIVE:  Vicki Chavez is a 20 year old female here for right ankle pain.  She developed right ankle pain laterally that started in May 2019 after she had hip surgery.  She reports that she had twisted her ankle in 2014 and reports that she fractured however x-ray report did not show fracture.    Nonetheless, she has been having lateral ankle pain especially with activity.  She tried icing which seemed to make her pain worse.  She does not recall any trauma after hip surgery or change in activities.    ROS:    As above otherwise ROS is unremarkable.    OBJECTIVE:  /70   Pulse 72   Temp 97.8  F (36.6  C)   Resp 16   Ht 1.778 m (5' 10\")   Wt 78 kg (172 lb)   SpO2 98%   BMI 24.68 kg/m      EXAM:  General Appearance: Pleasant, alert, appropriate appearance for age. No acute distress  Musculoskeletal: Right ankle shows normal range of motion.  She has pain with resisted eversion and plantarflexion.  She has mild pain with inversion.  Her tenderness is mostly over her lateral ligaments and her peroneal tendons.  No significant swelling.  Negative anterior drawer.  Skin: No bruising.  Neurologic Exam: Normal distal sensation light touch.    ASSESSEMENT AND PLAN:    1. Pain in joint involving ankle and foot, right      Given her continuing symptoms and x-ray of her ankle was performed, personally reviewed and shows no acute bony normality.  Her symptoms are consistent with inflammation of her peroneal ligaments or retinaculum.  Ice was not helpful and she is allergic to naproxen.  At this point time we will put her back in a ankle brace for consistent couple of weeks.  We also refer to physical therapy to help calm down the inflammation and strengthen her ankle.  If she has no significant provement could consider MR arthrogram of her ankle.    Erickson Magana MD    This document was prepared using voice generated software.  While every attempt was made for accuracy, grammatical errors may exist.  "

## 2020-08-04 NOTE — NURSING NOTE
Patient presents today for right ankle pain. Patient complains she has had this pain since having her hip surgery 05/24/2019. Patient reports previously fracturing her ankle.    Medication Reconciliation Complete    Kelsey Hernandez LPN  8/4/2020 4:29 PM

## 2020-08-05 ENCOUNTER — HOSPITAL ENCOUNTER (OUTPATIENT)
Dept: PHYSICAL THERAPY | Facility: OTHER | Age: 20
Setting detail: THERAPIES SERIES
End: 2020-08-05
Attending: FAMILY MEDICINE
Payer: COMMERCIAL

## 2020-08-05 DIAGNOSIS — M25.571 PAIN IN JOINT INVOLVING ANKLE AND FOOT, RIGHT: ICD-10-CM

## 2020-08-05 PROCEDURE — 97161 PT EVAL LOW COMPLEX 20 MIN: CPT | Mod: GP

## 2020-08-05 PROCEDURE — 97110 THERAPEUTIC EXERCISES: CPT | Mod: GP

## 2020-08-06 NOTE — PROGRESS NOTES
08/05/20 1500   General Information   Type of Visit Initial OP Ortho PT Evaluation   Start of Care Date 08/05/20   Referring Physician Boni Magana MD   Patient/Family Goals Statement To reduce her ankle pain   Orders Evaluate and Treat   Date of Order 08/04/20   Certification Required? No   Medical Diagnosis Pain in joint involving ankle and foot, right M25.571    Surgical/Medical history reviewed Yes   General Information Comments Patient goes back to school on the 21st       Present No   Body Part(s)   Body Part(s) Ankle/Foot   Presentation and Etiology   Pertinent history of current problem (include personal factors and/or comorbidities that impact the POC) Patient is referred to physical therapy with left ankle pain. She reports that it has been going on since last May of 2019. She had hip surgery back in May of 2019. This last May in 2020 she got her screws out of the pelvis and now the ankle has been acting up more. She reports having a fracture in her ankle back in 2013. She reports that she has to go back to college on the 21st. Surgery for the hip was for hip Dysplasia. Reports past medical history of hip dysplasia, anxiety, labral repair in hip, and Radha reconstructive hip surgery in 2019   Impairments A. Pain;C. Swelling;E. Decreased flexibility;H. Impaired gait   Functional Limitations perform activities of daily living;perform desired leisure / sports activities;perform required work activities   Symptom Location Right lateral ankle   How/Where did it occur Other  (Notes that it came on after hip surgery in 2019)   Onset date of current episode/exacerbation 08/04/20   Chronicity Recurrent   Pain rating (0-10 point scale) Best (/10);Worst (/10)   Best (/10) 2   Worst (/10) 8   Pain quality A. Sharp;B. Dull;C. Aching   Frequency of pain/symptoms B. Intermittent   Pain/symptoms are: Worse during the day   Pain/symptoms exacerbated by B. Walking   Pain/symptoms eased by C.  Rest;J. Braces/supports   Progression of symptoms since onset: Unchanged   Prior Level of Function   Prior Level of Function-Mobility Independent   Prior Level of Function-ADLs Independent   Current Level of Function   Current Community Support Family/friend caregiver   Patient role/employment history B. Student   Living environment House/townhome   Home/community accessibility Denies issues getting around her home other than the pain in the ankle   Current equipment-Gait/Locomotion None   Current equipment-ADL None   Fall Risk Screen   Fall screen completed by PT   Have you fallen 2 or more times in the past year? No   Have you fallen and had an injury in the past year? No   Is patient a fall risk? No   Ankle/Foot Objective Findings   Side (if bilateral, select both right and left) Right   Observation Wearing ankle brace for ambulation. No major gait devations   Integumentary No significant findings. No major edema   Posture Protracted shoulders   Gait/Locomotion No major deviations. Mild lack of push off in ankle   Anterior Drawer Test Negative   Posterior Drawer Test Negative   Palpation Discomfort near achilles and peronal tendons   Right DF (Knee Ext) AROM 5 degrees   Right PF AROM 65 degrees   Right Calcanceal Inversion AROM 25 degrees   Right Calcaneal Eversion AROM 20 degrees   Right DF/Inversion Strength 5/5   Right DF/Eversion Strength 4/5 due to pain   Right PF/Inversion Strength 5/5   Right PF/Eversion Strength 5/5 - with mild pain   Right Gastroc (in WB) Flexibility Mod limited   Planned Therapy Interventions   Planned Therapy Interventions joint mobilization;manual therapy;neuromuscular re-education;strengthening;stretching;ROM   Planned Modality Interventions   Planned Modality Interventions Cryotherapy;Hot packs;Electrical stimulation;Ultrasound   Clinical Impression   Criteria for Skilled Therapeutic Interventions Met yes, treatment indicated   PT Diagnosis Impaired mobility, decreased strength and  endurance, ankle pain   Influenced by the following impairments pain   Functional limitations due to impairments Prolonged activity, ambulation, stairs, running   Clinical Presentation Stable/Uncomplicated   Clinical Presentation Rationale minimal reported comobidities, clinical judgement   Clinical Decision Making (Complexity) Low complexity   Therapy Frequency other (see comments)  (1-2 times per week )   Predicted Duration of Therapy Intervention (days/wks) 4 weeks   Risk & Benefits of therapy have been explained Yes   Patient, Family & other staff in agreement with plan of care Yes   Clinical Impression Comments Signs and symptoms consistent with lateral ankle pain. Reports that she has some pain with walking and most weight bearing activities. Ligament testing negative. More discomfort with strength testing of her peroneal muscles. She would benefit from skilled PT services in order to reduce her pain and improve her mobility, strength, and endurance   Education Assessment   Barriers to Learning No barriers   ORTHO GOALS   PT Ortho Eval Goals 1;2;3   Ortho Goal 1   Goal Identifier Ambulation   Goal Description Patient will be able to ambulate on even and uneven ground for 10 minutes with no increase in lateral ankle pain in order to improve her overall mobility   Target Date 09/02/20   Ortho Goal 2   Goal Identifier stairs   Goal Description Patient will be able to ascend/descend 1 flight of stairs with no increase in lateral ankle pain in order to improve her overall mobility.    Target Date 09/02/20   Ortho Goal 3   Goal Identifier Running   Goal Description Patient will be able to run on even ground with no increase in lateral ankle pain in order to improve her overall mobility   Target Date 09/02/20   Total Evaluation Time   PT Eval, Low Complexity Minutes (75038) 30

## 2020-08-07 ENCOUNTER — HOSPITAL ENCOUNTER (OUTPATIENT)
Dept: PHYSICAL THERAPY | Facility: OTHER | Age: 20
Setting detail: THERAPIES SERIES
End: 2020-08-07
Attending: FAMILY MEDICINE
Payer: COMMERCIAL

## 2020-08-07 PROCEDURE — 97140 MANUAL THERAPY 1/> REGIONS: CPT | Mod: GP

## 2020-08-07 PROCEDURE — 97110 THERAPEUTIC EXERCISES: CPT | Mod: GP

## 2020-08-12 ENCOUNTER — HOSPITAL ENCOUNTER (OUTPATIENT)
Dept: PHYSICAL THERAPY | Facility: OTHER | Age: 20
Setting detail: THERAPIES SERIES
End: 2020-08-12
Attending: FAMILY MEDICINE
Payer: COMMERCIAL

## 2020-08-12 PROCEDURE — 97110 THERAPEUTIC EXERCISES: CPT | Mod: GP

## 2020-08-12 PROCEDURE — 97140 MANUAL THERAPY 1/> REGIONS: CPT | Mod: GP

## 2020-08-19 ENCOUNTER — HOSPITAL ENCOUNTER (OUTPATIENT)
Dept: PHYSICAL THERAPY | Facility: OTHER | Age: 20
Setting detail: THERAPIES SERIES
End: 2020-08-19
Attending: FAMILY MEDICINE
Payer: COMMERCIAL

## 2020-08-19 PROCEDURE — 97140 MANUAL THERAPY 1/> REGIONS: CPT | Mod: GP

## 2020-08-19 PROCEDURE — 97110 THERAPEUTIC EXERCISES: CPT | Mod: GP

## 2020-08-20 ENCOUNTER — HOSPITAL ENCOUNTER (OUTPATIENT)
Dept: PHYSICAL THERAPY | Facility: OTHER | Age: 20
Setting detail: THERAPIES SERIES
End: 2020-08-20
Attending: FAMILY MEDICINE
Payer: COMMERCIAL

## 2020-08-20 PROCEDURE — 97140 MANUAL THERAPY 1/> REGIONS: CPT | Mod: GP

## 2020-08-20 PROCEDURE — 97110 THERAPEUTIC EXERCISES: CPT | Mod: GP

## 2020-08-20 NOTE — PROGRESS NOTES
Outpatient Physical Therapy Discharge Note     Patient: Vicki Chavez  : 2000    Beginning/End Dates of Reporting Period:  2020 to 2020    Referring Provider: Boni Magana MD    Therapy Diagnosis: Impaired mobility, decreased strength and endurance, ankle pain     Client Self Report: Feels better today when compared to last visit. today is her last visit as she returns to college in [place] next Monday. She feels comfortable with where she is in her recover. Ankle feels better now when compared to when she started. Will follow up with care down in [place] if she has any further needs.     Objective Measurements:  Objective Measure: Subjective pain rating  Details: 3/10     Goals:  Goal Identifier Ambulation   Goal Description Patient will be able to ambulate on even and uneven ground for 10 minutes with no increase in lateral ankle pain in order to improve her overall mobility   Target Date 20   Date Met  20   Progress: MET     Goal Identifier stairs   Goal Description Patient will be able to ascend/descend 1 flight of stairs with no increase in lateral ankle pain in order to improve her overall mobility.    Target Date 20   Date Met  20   Progress: MET     Goal Identifier Running   Goal Description Patient will be able to run on even ground with no increase in lateral ankle pain in order to improve her overall mobility   Target Date 20   Date Met  20   Progress: MET     Progress Toward Goals:   Progress this reporting period: Patient has made good progress towards her goals. She has met all of her goals for this bout of therapy. She understands that her ankle is not 100% yet but she is much better when compared to when she first started therapy. She is leaving for college tomorrow and understands that she can pursue more physical therapy when she is down there. She is appropriate for discharge at this time with the recommendation to continue her exercises at  home.     Plan:  Discharge from therapy.    Discharge:    Reason for Discharge: Patient has met all goals. Is leaving for college tomorrow    Equipment Issued: none    Discharge Plan: Patient to continue home program.

## 2021-06-25 ENCOUNTER — ALLIED HEALTH/NURSE VISIT (OUTPATIENT)
Dept: FAMILY MEDICINE | Facility: OTHER | Age: 21
End: 2021-06-25
Payer: COMMERCIAL

## 2021-06-25 DIAGNOSIS — Z23 NEED FOR VACCINATION: Primary | ICD-10-CM

## 2021-06-25 PROCEDURE — 90715 TDAP VACCINE 7 YRS/> IM: CPT

## 2021-06-25 PROCEDURE — 90471 IMMUNIZATION ADMIN: CPT

## 2021-12-28 ENCOUNTER — HOSPITAL ENCOUNTER (EMERGENCY)
Facility: OTHER | Age: 21
Discharge: HOME | End: 2021-12-28
Attending: PHYSICIAN ASSISTANT | Admitting: PHYSICIAN ASSISTANT
Payer: COMMERCIAL

## 2021-12-28 ENCOUNTER — APPOINTMENT (OUTPATIENT)
Dept: CT IMAGING | Facility: OTHER | Age: 21
End: 2021-12-28
Attending: PHYSICIAN ASSISTANT
Payer: COMMERCIAL

## 2021-12-28 VITALS
RESPIRATION RATE: 16 BRPM | HEIGHT: 70 IN | TEMPERATURE: 98.7 F | BODY MASS INDEX: 24.62 KG/M2 | WEIGHT: 172 LBS | DIASTOLIC BLOOD PRESSURE: 72 MMHG | SYSTOLIC BLOOD PRESSURE: 128 MMHG | HEART RATE: 94 BPM | OXYGEN SATURATION: 98 %

## 2021-12-28 DIAGNOSIS — R59.0 ANTERIOR CERVICAL LYMPHADENOPATHY: ICD-10-CM

## 2021-12-28 DIAGNOSIS — T50.905A ADVERSE EFFECT OF DRUG, INITIAL ENCOUNTER: ICD-10-CM

## 2021-12-28 DIAGNOSIS — R59.0 AXILLARY LYMPHADENOPATHY: ICD-10-CM

## 2021-12-28 PROCEDURE — 99285 EMERGENCY DEPT VISIT HI MDM: CPT | Mod: 25 | Performed by: PHYSICIAN ASSISTANT

## 2021-12-28 PROCEDURE — 250N000011 HC RX IP 250 OP 636: Performed by: PHYSICIAN ASSISTANT

## 2021-12-28 PROCEDURE — 99285 EMERGENCY DEPT VISIT HI MDM: CPT | Performed by: PHYSICIAN ASSISTANT

## 2021-12-28 PROCEDURE — 71275 CT ANGIOGRAPHY CHEST: CPT

## 2021-12-28 PROCEDURE — 99283 EMERGENCY DEPT VISIT LOW MDM: CPT | Performed by: PHYSICIAN ASSISTANT

## 2021-12-28 RX ORDER — IOPAMIDOL 755 MG/ML
76 INJECTION, SOLUTION INTRAVASCULAR ONCE
Status: COMPLETED | OUTPATIENT
Start: 2021-12-28 | End: 2021-12-28

## 2021-12-28 RX ADMIN — IOPAMIDOL 76 ML: 755 INJECTION, SOLUTION INTRAVENOUS at 12:54

## 2021-12-28 ASSESSMENT — MIFFLIN-ST. JEOR: SCORE: 1620.44

## 2021-12-28 ASSESSMENT — ENCOUNTER SYMPTOMS
CONFUSION: 0
BACK PAIN: 0
ABDOMINAL PAIN: 0
SHORTNESS OF BREATH: 0
HEMATURIA: 0
FEVER: 0
CHEST TIGHTNESS: 0
WOUND: 0
BRUISES/BLEEDS EASILY: 0
CHILLS: 0

## 2021-12-28 NOTE — ED PROVIDER NOTES
History     Chief Complaint   Patient presents with     Mass     HPI  Vicki Chavez is a 21 year old female who has a history of factor V Leiden.  She received her Covid booster 2 days ago and this morning noticed some lumps around her neck and collarbone area these extend into her shoulder.  She has noticed some lesions on her anterior chest as well.  She did have some increased shortness of breath yesterday but this seems to have improved.  Denies any other issues at this time.  No fever or chills.  No cough or sore throat.  No lightheadedness or dizziness.  No nausea or vomiting.  She is here for further evaluation.    Allergies:  Allergies   Allergen Reactions     Naproxen Hives     Urticaria,joint and lip angioedema, no respiratory compromise.        Problem List:    Patient Active Problem List    Diagnosis Date Noted     Headache 02/14/2018     Priority: Medium     Overview:   Vicki gets both migraine and daily tension headaches.  Tends to lose vision with migraines.  Seeing chiropractor and feels that is helpful.  Signed by Corina Palacio MD .....7/18/2014 2:42 PM       Family history of factor V Leiden mutation 07/15/2016     Priority: Medium     Compound nevus 07/20/2015     Priority: Medium        Past Medical History:    Past Medical History:   Diagnosis Date     Constipation      Headache      Other allergy status, other than to drugs and biological substances      Other fracture of right lower leg, initial encounter for closed fracture      Personal history of diseases of skin or subcutaneous tissue        Past Surgical History:    Past Surgical History:   Procedure Laterality Date     ARTHROSCOPY HIP Right 07/2016    Lucretia Melvin     ARTHROSCOPY HIP Right 05/24/2019    MISAEL with arthroscopy and labral repair, femoral head/neck offset correction; [employer] [facility]     REMOVE HARDWARE LOWER EXTREMITY Right 2020    hip       Family History:    Family History   Problem Relation Age of Onset      "Other - See Comments Mother         Psychiatric illness,describes herself as an anxious person     Other - See Comments Father         Factor V Leiden     Heart Disease Father         Heart Disease, PEs x 4     Asthma Father         Asthma     Diabetes Father      Hyperlipidemia Father      Heart Disease Maternal Grandfather         Heart Disease, MI, pacemaker     Diabetes Paternal Grandmother      Hyperlipidemia Paternal Grandmother        Social History:  Marital Status:  Single [1]  Social History     Tobacco Use     Smoking status: Never Smoker     Smokeless tobacco: Never Used   Substance Use Topics     Alcohol use: Yes     Comment: socially      Drug use: Never        Medications:    acetaminophen (TYLENOL) 325 MG tablet          Review of Systems   Constitutional: Negative for chills and fever.   HENT: Negative for congestion.         Tender lumps and bumps on her neck, left clavicular area and left anterior chest   Eyes: Negative for visual disturbance.   Respiratory: Negative for chest tightness and shortness of breath.    Cardiovascular: Negative for chest pain.   Gastrointestinal: Negative for abdominal pain.   Genitourinary: Negative for hematuria.   Musculoskeletal: Negative for back pain.   Skin: Negative for rash and wound.   Neurological: Negative for syncope.   Hematological: Does not bruise/bleed easily.   Psychiatric/Behavioral: Negative for confusion.   All other systems reviewed and are negative.      Physical Exam   BP: 128/72  Pulse: 94  Temp: 98.7  F (37.1  C)  Resp: 16  Height: 177.8 cm (5' 10\")  Weight: 78 kg (172 lb)  SpO2: 98 %      Physical Exam  Vitals and nursing note reviewed.   Constitutional:       General: She is not in acute distress.     Appearance: Normal appearance. She is not ill-appearing or toxic-appearing.   HENT:      Head: Normocephalic. No raccoon eyes, right periorbital erythema or left periorbital erythema.      Right Ear: No drainage or tenderness.      Left Ear: No " drainage or tenderness.      Nose: Nose normal.   Eyes:      General: Lids are normal. Gaze aligned appropriately. No scleral icterus.     Extraocular Movements: Extraocular movements intact.   Neck:      Trachea: No tracheal deviation.      Comments: Tender superficial lymphadenopathy on her neck, left clavicular area and left anterior chest.  Cardiovascular:      Rate and Rhythm: Normal rate.   Pulmonary:      Effort: Pulmonary effort is normal. No respiratory distress.      Breath sounds: Normal breath sounds. No stridor. No wheezing.      Comments: Lung sounds are clear.  SaO2 is 98% on room air.  She does not appear to be in any respiratory distress at this time.  No tachypnea  Abdominal:      Tenderness: There is no abdominal tenderness.   Musculoskeletal:         General: No deformity or signs of injury. Normal range of motion.      Cervical back: Normal range of motion. No signs of trauma.   Lymphadenopathy:      Cervical: Cervical adenopathy present.      Left cervical: Superficial cervical adenopathy present.   Skin:     General: Skin is warm and dry.      Coloration: Skin is not jaundiced or pale.   Neurological:      General: No focal deficit present.      Mental Status: She is alert and oriented to person, place, and time.      GCS: GCS eye subscore is 4. GCS verbal subscore is 5. GCS motor subscore is 6.      Motor: No tremor or seizure activity.   Psychiatric:         Attention and Perception: Attention normal.         Mood and Affect: Mood normal.         ED Course        Results for orders placed or performed during the hospital encounter of 12/28/21 (from the past 24 hour(s))   CT Chest Pulmonary Embolism w Contrast    Narrative    PROCEDURE: CT CHEST PULMONARY EMBOLISM W CONTRAST 12/28/2021 1:10 PM    HISTORY: PE suspected, low/intermediate prob, neg D-dimer short of  breath    COMPARISONS: None.    Meds/Dose Given: 76 ml isovue 370    TECHNIQUE: CT angiogram of the chest was performed with  sagittal and  coronal MIP reconstructions    FINDINGS: There are no pulmonary emboli. The thoracic aorta is normal.  The heart is normal in size.    The lungs are clear. The hilar and mediastinal lymph nodes are normal  in caliber. The left axillary lymph nodes are enlarged.  Supraclavicular lymph nodes are normal.    The upper portion of the liver spleen and pancreas appear normal. The  adrenal glands are normal. The regional skeleton is intact.         Impression    IMPRESSION: Enlarged left axillary lymph nodes.    No pulmonary emboli.    OSMANY LUNA MD         SYSTEM ID:  [id number]       Medications - No data to display    Assessments & Plan (with Medical Decision Making)     I have reviewed the nursing notes.    I have reviewed the findings, diagnosis, plan and need for follow up with the patient.      Discharge Medication List as of 12/28/2021  1:33 PM          Final diagnoses:   Anterior cervical lymphadenopathy   Axillary lymphadenopathy - left   Adverse effect of drug, initial encounter - AFTER COVID BOOSTER     Afebrile.  Vital signs stable.  Patient with significant cervical and left chest and axillary lymphadenopathy after a Covid booster shot.  History of factor V Leiden's.  Concerns for possible PE by patient and her parent.  Given these findings I discussed this with Dr. Luna for the best approach to evaluation given her underlying conditions.  A CTA of the patient's chest was ordered with IV contrast.  This shows no signs of pulmonary emboli which is encouraging.  Patient does have an enlarged left axillary lymph nodes.    I discussed these findings with the patient and her mother.  I discussed using Motrin and Tylenol for pain and tenderness of her lymph nodes.  Most likely this is a reaction to her Covid injection.  These should dissipate over the next 3-5 days.  Warm compresses can help as well.  Continue to monitor return if there is any concerns for further evaluation as  needed.        12/28/2021   [facility]     Markel Hernandez PA-C  12/28/21 [id number]

## 2021-12-28 NOTE — ED TRIAGE NOTES
"Pt here with family, pt reports that she got her booster shot 2 days ago and this AM noticed some lumps around her collarbone and pain shooting up into her neck and into her left arm, pt also reports having a clotting disorder, VSS, pt brought back into ER to be evaluated  ED Nursing Triage Note (General)   ________________________________    Vicki Chavez is a 21 year old Female that presents to triage private car  With history of  Lumps in neck reported by patient   Significant symptoms had onset this AM   /72   Pulse 94   Temp 98.7  F (37.1  C) (Tympanic)   Resp 16   Ht 1.778 m (5' 10\")   Wt 78 kg (172 lb)   SpO2 98%   BMI 24.68 kg/m  t  Patient appears alert  and oriented, in no acute distress., and cooperative and pleasant behavior.    GCS Total = 15  Airway: intact  Breathing noted as Normal  Circulation Normal  Skin:  Normal  Action taken:  Triage to critical care immediately      PRE HOSPITAL PRIOR LIVING SITUATION Alone    "

## 2024-10-28 NOTE — PROGRESS NOTES
Immunization Documentation  Verified patient's first and last name, and . Stated reason for visit today is to receive Boostrix vaccine(s). Accompained to clinic visit with self. Denied any concerns with previous immunizations. Allergies reviewed. VIS handout(s) reviewed and given to take home. Boostrix prepared and administered per standing order. Administration documented in IMMUNIZATIONS (see flowsheet and order for further information). pt Instructed to wait in lobby for 15 minutes post-injection and notify staff immediately of any reaction.     Cate Story RN ....................  2021   10:58 AM         PRINCIPAL DISCHARGE DIAGNOSIS  Diagnosis: GI bleed  Assessment and Plan of Treatment:       SECONDARY DISCHARGE DIAGNOSES  Diagnosis: HCAP (healthcare-associated pneumonia)  Assessment and Plan of Treatment:

## (undated) RX ORDER — BETAMETHASONE SODIUM PHOSPHATE AND BETAMETHASONE ACETATE 3; 3 MG/ML; MG/ML
INJECTION, SUSPENSION INTRA-ARTICULAR; INTRALESIONAL; INTRAMUSCULAR; SOFT TISSUE
Status: DISPENSED
Start: 2018-08-13

## (undated) RX ORDER — LIDOCAINE HYDROCHLORIDE 10 MG/ML
INJECTION, SOLUTION INFILTRATION; PERINEURAL
Status: DISPENSED
Start: 2018-08-13

## (undated) RX ORDER — ACETAMINOPHEN 500 MG
TABLET ORAL
Status: DISPENSED
Start: 2018-06-24

## (undated) RX ORDER — BUPIVACAINE HYDROCHLORIDE 5 MG/ML
INJECTION, SOLUTION EPIDURAL; INTRACAUDAL
Status: DISPENSED
Start: 2018-08-13